# Patient Record
Sex: FEMALE | Race: BLACK OR AFRICAN AMERICAN | NOT HISPANIC OR LATINO | Employment: STUDENT | URBAN - METROPOLITAN AREA
[De-identification: names, ages, dates, MRNs, and addresses within clinical notes are randomized per-mention and may not be internally consistent; named-entity substitution may affect disease eponyms.]

---

## 2018-05-14 ENCOUNTER — INITIAL CONSULT (OUTPATIENT)
Dept: OPHTHALMOLOGY | Facility: CLINIC | Age: 18
End: 2018-05-14
Payer: COMMERCIAL

## 2018-05-14 DIAGNOSIS — H52.213 IRREGULAR ASTIGMATISM OF BOTH EYES: ICD-10-CM

## 2018-05-14 DIAGNOSIS — H18.603 KERATOCONUS OF BOTH EYES: Primary | ICD-10-CM

## 2018-05-14 PROCEDURE — 92025 CPTRIZED CORNEAL TOPOGRAPHY: CPT | Mod: S$GLB,,, | Performed by: OPHTHALMOLOGY

## 2018-05-14 PROCEDURE — 92004 COMPRE OPH EXAM NEW PT 1/>: CPT | Mod: S$GLB,,, | Performed by: OPHTHALMOLOGY

## 2018-05-14 PROCEDURE — 99999 PR PBB SHADOW E&M-NEW PATIENT-LVL II: CPT | Mod: PBBFAC,,, | Performed by: OPHTHALMOLOGY

## 2018-05-14 NOTE — Clinical Note
pls add to CXL list.  And lmk when old records from [facility] arrive - so I can submit for insurance. Thanks.

## 2018-05-14 NOTE — LETTER
May 15, 2018      Gilbert Barrett MD  [address] [facility]  [address]  [place] [address]           [facility] - Ophthalmology  [address]  [place] [address]  Phone: [phone]  Fax: [phone]          Patient: Mesha Parada   MR Number: [id number]   YOB: 2000   Date of Visit: 5/14/2018       Dear Dr. Gilbert Barrett:    Thank you for referring Mesha Parada to me for evaluation. Attached you will find relevant portions of my assessment and plan of care.    If you have questions, please do not hesitate to call me. I look forward to following Mesha Parada along with you.    Sincerely,    Millie Phelan MD    Enclosure  CC:  No Recipients    If you would like to receive this communication electronically, please contact [email or web address] or [phone] to request more information on [employer] Link access.    For providers and/or their staff who would like to refer a patient to [facility], please contact us through our one-stop-shop provider referral line, [facility], at [phone].    If you feel you have received this communication in error or would no longer like to receive these types of communications, please e-mail [email or web address]

## 2018-05-15 NOTE — PROGRESS NOTES
HPI     Dr Gilbert Barrett referral    KCN OU - recently dx'ed    No eyedrops  No eye surgery     Pt referred by Dr Barrett for eval keratoconus OU.  Pt without complaints   today OU.  Pt denies eye pain OU today.     Last edited by Millie Phelan MD on 5/15/2018  9:51 AM. (History)            Assessment /Plan     For exam results, see Encounter Report.    Keratoconus of both eyes  -     Computerized corneal topography    Irregular astigmatism of both eyes  -     Computerized corneal topography      Will request old records from Dr. Marmolejo to determine old Mrx and compare to today's Arx/Mrx = and submit request for insurance approval.    Pt also has old pair of glasses that she can bring if necessary    KERATOCONUS OU     The diagnosis of corneal ectasia and its etiology and clinical course were discussed. Treatment with the use of specialty contact lenses, collagen cross linking, and corneal transplantation was explained in detail.     This patient would be an excellent candidate for the FDA approved epi-off collagen cross linking.  R/B/A discussed. Goal is to prevent progression of ectasia.  Pt will be fit with specialty CL after cornea has healed.    I personally reviewed old records.  There has been a significant increase in cyl on Mrx and increase in myopia as well.  This patient exhibits signs of progression of keratoconus and failure of conservative treatments with spectacles and/or contact lenses.     Disease progression is indicated by:  - An increase of >1D in the Kmax  - An increase of >1D of astigmatism in the MRx  - An increase in myopia of >0.50D on MRx     I recommend treatment with Collagen Crosslinking using the Avedro FDA approved epi-off protocol with Photrexa and the KXL System, in order to stabilize this condition.     Plan:   KXL treatment OS 1st  advanced KCN CCT --> will need pentacam pre-op     I have informed the patient that we will seek insurance pre-approval, but in case of failure of the  insurance company to cover the cost of this medically necessary procedure, there may be a cost of $2,000 for the patient.

## 2018-07-18 ENCOUNTER — TELEPHONE (OUTPATIENT)
Dept: OPHTHALMOLOGY | Facility: CLINIC | Age: 18
End: 2018-07-18

## 2018-07-18 NOTE — TELEPHONE ENCOUNTER
Spoke to pt mother and advised that we are working on a date for CXL and will call her back once I have a date for her.  Advised pt mother, per Dr Phelan, to hold off on getting glasses for pt .  Pt mother understood.

## 2018-07-23 ENCOUNTER — TELEPHONE (OUTPATIENT)
Dept: OPHTHALMOLOGY | Facility: CLINIC | Age: 18
End: 2018-07-23

## 2018-07-23 NOTE — TELEPHONE ENCOUNTER
Spoke to pt mother and scheduled pt CXL procedure.  Went over pre-op instructions and post op instructions with pt mother.  Pt mother will call back to reschedule if the date does not work for the pt.

## 2018-07-23 NOTE — TELEPHONE ENCOUNTER
----- Message from Lorri Estrada MA sent at 7/18/2018 11:14 AM CDT -----  Adelaida IRIZARRY Staff  Caller: Mother (Today,  9:33 AM)         Pt mother calling to find out what her daughter should do about glasses.  Should she continue to wear what she has until we determine if she is approved for cross-linking or should she see an optometrist for a new prescription.  She can be reached at [phone] (Mesha) or her mother [phone].

## 2018-08-15 ENCOUNTER — TELEPHONE (OUTPATIENT)
Dept: OPHTHALMOLOGY | Facility: CLINIC | Age: 18
End: 2018-08-15

## 2018-08-15 NOTE — TELEPHONE ENCOUNTER
Spoke to pt and advised of the $2000.00 fee that is to be paid prior to the CXL procedure.  Advised pt that Dr Phelan will confirm it is pre-authorized before doing the procedure and if so they will get reimbursed by the insurance company.  Pt understood but states she wanted to see Dr Phelan with her mother to discuss things further before the procedure.  Offered pt several dates and times, but pt states she will have to call back when she figures out her school schedule.

## 2018-08-31 ENCOUNTER — TELEPHONE (OUTPATIENT)
Dept: OPHTHALMOLOGY | Facility: CLINIC | Age: 18
End: 2018-08-31

## 2018-08-31 NOTE — TELEPHONE ENCOUNTER
----- Message from Lorri Estrada MA sent at 8/31/2018 12:06 PM CDT -----  Adelaida IRIZARRY Staff  Caller: Mesha (Today, 11:54 AM)         Pt calling to speak with Lorri.  She gave no reason for the call but would like her to call them back at [phone]

## 2018-09-05 ENCOUNTER — TELEPHONE (OUTPATIENT)
Dept: OPHTHALMOLOGY | Facility: CLINIC | Age: 18
End: 2018-09-05

## 2018-09-05 NOTE — TELEPHONE ENCOUNTER
Spoke to pt and she confirmed procedure with paying $2000.  Answered pt questions regarding procedure and eyedrops to be used 2 days before the procedure.  Pt understood and was advised to call with any questions or concerns.

## 2018-09-05 NOTE — TELEPHONE ENCOUNTER
----- Message from Lorri Estrada MA sent at 8/31/2018 12:06 PM CDT -----  Adelaida IRIZARRY Staff  Caller: Mesha (Today, 11:54 AM)         Pt calling to speak with Lorri.  She gave no reason for the call but would like her to call them back at [phone]

## 2018-09-12 ENCOUNTER — TELEPHONE (OUTPATIENT)
Dept: OPHTHALMOLOGY | Facility: CLINIC | Age: 18
End: 2018-09-12

## 2018-09-12 NOTE — TELEPHONE ENCOUNTER
Tried to call pt regarding prescriptions for CXL with Dr Phelan on 9/17/18.  No answer, LM for pt to return my call.

## 2018-09-13 ENCOUNTER — TELEPHONE (OUTPATIENT)
Dept: OPHTHALMOLOGY | Facility: CLINIC | Age: 18
End: 2018-09-13

## 2018-09-13 NOTE — TELEPHONE ENCOUNTER
----- Message from Lorri Estrada MA sent at 9/13/2018  8:59 AM CDT -----  FELECIA Sam MA  Caller: Mesha Martin Messages      ----- Message -----   From: Rosa James   Sent: 9/12/2018  10:55 AM   To: Tapan IRIZARRY Staff     Patient Returning Call from [facility]     Who Left Message for Patient:Lorri   Communication Preference:[phone]   Additional Information:

## 2018-09-13 NOTE — TELEPHONE ENCOUNTER
Spoke to pt and she did not get an rx for eyedrops or Valium.  Advised pt that I would have Dr Phelan send in the prescriptions.  Pt understood.

## 2018-09-13 NOTE — TELEPHONE ENCOUNTER
----- Message from Lorri Estrada MA sent at 9/13/2018  8:59 AM CDT -----  FELECIA Sam MA  Caller: Mesha Martin Messages      ----- Message -----   From: Rosa James   Sent: 9/12/2018  10:55 AM   To: Tapan IRIZARRY Staff     Patient Returning Call from [facility]     Who Left Message for Patient:Lorri   Communication Preference:[phone]   Additional Information:

## 2018-09-14 ENCOUNTER — TELEPHONE (OUTPATIENT)
Dept: OPHTHALMOLOGY | Facility: CLINIC | Age: 18
End: 2018-09-14

## 2018-09-14 RX ORDER — PREDNISOLONE ACETATE 10 MG/ML
1 SUSPENSION/ DROPS OPHTHALMIC 3 TIMES DAILY
Qty: 5 ML | Refills: 1 | Status: SHIPPED | OUTPATIENT
Start: 2018-09-14 | End: 2018-09-24

## 2018-09-14 RX ORDER — DIAZEPAM 2 MG/1
2 TABLET ORAL ONCE AS NEEDED
Qty: 3 TABLET | Refills: 0 | Status: SHIPPED | OUTPATIENT
Start: 2018-09-14 | End: 2018-09-14 | Stop reason: SDUPTHER

## 2018-09-14 RX ORDER — OFLOXACIN 3 MG/ML
1 SOLUTION/ DROPS OPHTHALMIC 3 TIMES DAILY
Qty: 5 ML | Refills: 0 | Status: SHIPPED | OUTPATIENT
Start: 2018-09-14 | End: 2018-10-14

## 2018-09-14 RX ORDER — DIAZEPAM 2 MG/1
2 TABLET ORAL ONCE AS NEEDED
Qty: 3 TABLET | Refills: 0 | Status: SHIPPED | OUTPATIENT
Start: 2018-09-14 | End: 2019-01-18

## 2018-09-17 ENCOUNTER — CLINICAL SUPPORT (OUTPATIENT)
Dept: OPHTHALMOLOGY | Facility: CLINIC | Age: 18
End: 2018-09-17
Attending: OPHTHALMOLOGY
Payer: COMMERCIAL

## 2018-09-17 DIAGNOSIS — H52.213 IRREGULAR ASTIGMATISM OF BOTH EYES: ICD-10-CM

## 2018-09-17 DIAGNOSIS — H18.603 KERATOCONUS OF BOTH EYES: ICD-10-CM

## 2018-09-17 PROCEDURE — 66999 UNLISTED PX ANT SEGMENT EYE: CPT | Mod: S$GLB,,, | Performed by: OPHTHALMOLOGY

## 2018-09-17 PROCEDURE — 99499 UNLISTED E&M SERVICE: CPT | Mod: S$GLB,,, | Performed by: OPHTHALMOLOGY

## 2018-09-17 PROCEDURE — 0402T COLGN CRS-LINK CRN&PACHYMTRY: CPT | Mod: S$GLB,,, | Performed by: OPHTHALMOLOGY

## 2018-09-17 RX ORDER — OXYCODONE AND ACETAMINOPHEN 7.5; 325 MG/1; MG/1
1 TABLET ORAL EVERY 4 HOURS PRN
Qty: 10 TABLET | Refills: 0 | Status: SHIPPED | OUTPATIENT
Start: 2018-09-17 | End: 2018-09-21

## 2018-09-17 NOTE — PROGRESS NOTES
HPI     Dr Gilbert Barrett referral     KCN OU - recently dx'ed     Here for CXL    Last edited by Millie Phelan MD on 9/17/2018 10:06 AM. (History)            Assessment /Plan     For exam results, see Encounter Report.    Keratoconus of both eyes  -     Collagen Cross-Linking    Irregular astigmatism of both eyes  -     Collagen Cross-Linking    Other orders  -     oxyCODONE-acetaminophen (PERCOCET) 7.5-325 mg per tablet; Take 1 tablet by mouth every 4 (four) hours as needed for Pain.  Dispense: 10 tablet; Refill: 0      SURGEON: Millie Phelan MD     PREOPERATIVE DIAGNOSIS: Keratoconus     POSTOPERATIVE DIAGNOSIS: keratoconus     PROCEDURES: Collagen Crosslinking LEFT EYE    ANESTHESIA: Topical Tetracaine 0.5%     COMPLICATIONS: None     ESTIMATED BLOOD LOSS: None     SPECIMENS: None     INDICATIONS:  The patient has a history a progressive corneal ectasia. During the initial consultation, a thorough discussion regarding of the risks, benefits, and alternatives to this procedure was undertaken. Risks were listed on the consent form and were reviewed with emphasis on the remote possibility of infection, inflammation, scarring, and progression of ectasia - all of which can potentially lead to loss of vision. Common side effects from the procedure, including pain, dry eye, glare, and haloes, were also explained, as well as defining realistic expectations of stabilizing the disease but not decreasing the need for glasses or contact lenses. The patient voices understanding of these risks and side effects and communicates realistic expectations from the procedure.      DESCRIPTION OF PROCEDURE:  The patient was admitted to the [facility] at [facility] where the operative eye and procedure were verified, vital signs were taken, and pre-operatively 5-10mg of oral diazepam and drops of Zymar and Pred Forte were administered. The patient was brought into the LASER suite and positioned on the bed. A central  9mm epithelial debridement was achieved with the \[facility]\"" epithelial scrubber, and the initial riboflavin drops administered. A 30 minute induction with drops every 2 minutes was followed by pachymetry. When corneal pachy is less than 400um, hypotonic riboflavin drops are used to thicken the cornea to a minimum of 400um. Next, a 30 min UV light treatment, centered on the cornea was delivered. Antibiotics and a bandage contact lens were placed.  The patient was discharged with care instructions and a follow up appointment in the eye clinic.       F/up Friday - VA OS only, GK to remove BCL

## 2018-09-21 ENCOUNTER — OFFICE VISIT (OUTPATIENT)
Dept: OPHTHALMOLOGY | Facility: CLINIC | Age: 18
End: 2018-09-21
Payer: COMMERCIAL

## 2018-09-21 DIAGNOSIS — H18.603 KERATOCONUS OF BOTH EYES: Primary | ICD-10-CM

## 2018-09-21 DIAGNOSIS — H52.213 IRREGULAR ASTIGMATISM OF BOTH EYES: ICD-10-CM

## 2018-09-21 PROCEDURE — 99999 PR PBB SHADOW E&M-EST. PATIENT-LVL II: CPT | Mod: PBBFAC,,, | Performed by: OPHTHALMOLOGY

## 2018-09-21 PROCEDURE — 99024 POSTOP FOLLOW-UP VISIT: CPT | Mod: S$GLB,,, | Performed by: OPHTHALMOLOGY

## 2018-09-21 RX ORDER — BETAMETHASONE DIPROPIONATE 0.5 MG/ML
LOTION, AUGMENTED TOPICAL
Refills: 3 | COMMUNITY
Start: 2018-08-14 | End: 2019-03-08

## 2018-09-21 RX ORDER — PANTOPRAZOLE SODIUM 40 MG/1
40 TABLET, DELAYED RELEASE ORAL DAILY
Refills: 6 | COMMUNITY
Start: 2018-06-15 | End: 2019-01-18

## 2018-09-21 NOTE — LETTER
September 21, 2018      [facility] - Ophthalmology  [address]  [facility] [address]  Phone: [phone]  Fax: [phone]       Patient: Mesha Parada   YOB: 2000  Date of Visit: 09/21/2018    To Whom It May Concern:    Melissa Parada  was at [facility] on 09/21/2018. Please excuse her from school on 09/17/2018 for surgery. She may return to school on 09/26/2018 with no restrictions. If you have any questions or concerns, or if I can be of further assistance, please do not hesitate to contact me.    Sincerely,  ADELINA Clemente MA

## 2018-09-21 NOTE — PROGRESS NOTES
HPI     Dr Gilbert Barrett referral     S/p CXL OS 9/17/18  KCN OU - recently dx'ed     Gtts: PF 4 times a day OS           Ocuflox 4 times a day OS    Patient states she is doing well. Denies any pain. Slight photophobia OS     Last edited by Millie Phelan MD on 9/21/2018  2:19 PM. (History)            Assessment /Plan     For exam results, see Encounter Report.    Keratoconus of both eyes    Irregular astigmatism of both eyes      S/p CXL OS 9/17/18  - doing well, bcl removed  - okay to stop abx  - PF TID     F/up 4 wks - va/IOP OS    KCN OU - recently dx'ed     Can discuss CXL OD next.

## 2018-09-21 NOTE — LETTER
September 21, 2018      [facility] - Ophthalmology  [address]  [facility] [address]  Phone: [phone]  Fax: [phone]       Patient: Mesha Parada   YOB: 2000  Date of Visit: 09/21/2018    To Whom It May Concern:    Melissa Parada  was at [facility] on 09/21/2018. She may return to school on 09/24/2018 with no restrictions. If you have any questions or concerns, or if I can be of further assistance, please do not hesitate to contact me.    Sincerely,  Dr.Ginny Tapan M.D.    Eric Johnston MA

## 2018-10-19 ENCOUNTER — OFFICE VISIT (OUTPATIENT)
Dept: OPHTHALMOLOGY | Facility: CLINIC | Age: 18
End: 2018-10-19
Payer: COMMERCIAL

## 2018-10-19 DIAGNOSIS — H52.213 IRREGULAR ASTIGMATISM OF BOTH EYES: ICD-10-CM

## 2018-10-19 DIAGNOSIS — H18.603 KERATOCONUS OF BOTH EYES: Primary | ICD-10-CM

## 2018-10-19 PROCEDURE — 99999 PR PBB SHADOW E&M-EST. PATIENT-LVL II: CPT | Mod: PBBFAC,,, | Performed by: OPHTHALMOLOGY

## 2018-10-19 PROCEDURE — 99024 POSTOP FOLLOW-UP VISIT: CPT | Mod: S$GLB,,, | Performed by: OPHTHALMOLOGY

## 2018-10-19 NOTE — PROGRESS NOTES
HPI     Dr Gilbert Barrett referral     S/p CXL OS 9/17/18  KCN OU -  Recently dx'ed    Gtts: PF 4 times a day OS           Ocuflox 4 times a day OS     pt is here for 4 week CXL f/u.no complaints.    Last edited by Millie Phelan MD on 10/19/2018  2:54 PM. (History)            Assessment /Plan     For exam results, see Encounter Report.    Keratoconus of both eyes    Irregular astigmatism of both eyes          S/p CXL OS 9/17/18  - doing well, PF QD x 2 wks then d/c    KCN OU - recently dx'ed     LOY OU, ARx/ Mrx OU    Can discuss CXL OD next. And possible need for CL fit.

## 2019-01-18 ENCOUNTER — OFFICE VISIT (OUTPATIENT)
Dept: OPHTHALMOLOGY | Facility: CLINIC | Age: 19
End: 2019-01-18
Payer: COMMERCIAL

## 2019-01-18 DIAGNOSIS — H18.603 KERATOCONUS OF BOTH EYES: Primary | ICD-10-CM

## 2019-01-18 DIAGNOSIS — H52.213 IRREGULAR ASTIGMATISM OF BOTH EYES: ICD-10-CM

## 2019-01-18 PROCEDURE — 92025 CPTRIZED CORNEAL TOPOGRAPHY: CPT | Mod: S$GLB,,, | Performed by: OPHTHALMOLOGY

## 2019-01-18 PROCEDURE — 92025 COMPUTERIZED CORNEAL TOPOGRAPHY: ICD-10-PCS | Mod: S$GLB,,, | Performed by: OPHTHALMOLOGY

## 2019-01-18 PROCEDURE — 99999 PR PBB SHADOW E&M-EST. PATIENT-LVL II: CPT | Mod: PBBFAC,,, | Performed by: OPHTHALMOLOGY

## 2019-01-18 PROCEDURE — 99024 PR POST-OP FOLLOW-UP VISIT: ICD-10-PCS | Mod: S$GLB,,, | Performed by: OPHTHALMOLOGY

## 2019-01-18 PROCEDURE — 99999 PR PBB SHADOW E&M-EST. PATIENT-LVL II: ICD-10-PCS | Mod: PBBFAC,,, | Performed by: OPHTHALMOLOGY

## 2019-01-18 PROCEDURE — 99024 POSTOP FOLLOW-UP VISIT: CPT | Mod: S$GLB,,, | Performed by: OPHTHALMOLOGY

## 2019-01-18 NOTE — Clinical Note
pls add to cxl list - OD, she's had OS done already.  She is insured - 2K.  (is that what she paid for OS?)She wants it done over the summer - June or July. I told her we would call May'venkat with date.

## 2019-01-18 NOTE — PROGRESS NOTES
HPI     Concerns About Ocular Health      Additional comments: angela              Comments     Dr Gilbert Barrett referral     S/p CXL OS 9/17/18  ANGELA OU -  Dx'ed 2018    Gtts: AT's      pt is here for 3 mo CXL OD  Pt doing well stopped gtts no discomfort or   problems             Last edited by Millie Phelan MD on 1/18/2019 12:54 PM. (History)            Assessment /Plan     For exam results, see Encounter Report.    Keratoconus of both eyes  -     Computerized corneal topography    Irregular astigmatism of both eyes  -     Computerized corneal topography      S/p CXL OS 9/17/18  - doing well off all gtts  - okay for CL fit with dr. Joy MILLER OU   - dx'ed 2018  - would benefit from CXL OD --> pt wants to schedule during the summer

## 2019-01-21 ENCOUNTER — TELEPHONE (OUTPATIENT)
Dept: OPTOMETRY | Facility: CLINIC | Age: 19
End: 2019-01-21

## 2019-01-23 ENCOUNTER — TELEPHONE (OUTPATIENT)
Dept: OPTOMETRY | Facility: CLINIC | Age: 19
End: 2019-01-23

## 2019-03-07 ENCOUNTER — TELEPHONE (OUTPATIENT)
Dept: OPTOMETRY | Facility: CLINIC | Age: 19
End: 2019-03-07

## 2019-03-08 ENCOUNTER — INITIAL CONSULT (OUTPATIENT)
Dept: OPTOMETRY | Facility: CLINIC | Age: 19
End: 2019-03-08
Payer: COMMERCIAL

## 2019-03-08 DIAGNOSIS — H18.603 KERATOCONUS OF BOTH EYES: Primary | ICD-10-CM

## 2019-03-08 DIAGNOSIS — H52.213 IRREGULAR ASTIGMATISM, BILATERAL: ICD-10-CM

## 2019-03-08 PROCEDURE — 92012 INTRM OPH EXAM EST PATIENT: CPT | Mod: S$GLB,,, | Performed by: OPTOMETRIST

## 2019-03-08 PROCEDURE — 99999 PR PBB SHADOW E&M-EST. PATIENT-LVL I: ICD-10-PCS | Mod: PBBFAC,,, | Performed by: OPTOMETRIST

## 2019-03-08 PROCEDURE — 92012 PR EYE EXAM, EST PATIENT,INTERMED: ICD-10-PCS | Mod: S$GLB,,, | Performed by: OPTOMETRIST

## 2019-03-08 PROCEDURE — 99999 PR PBB SHADOW E&M-EST. PATIENT-LVL I: CPT | Mod: PBBFAC,,, | Performed by: OPTOMETRIST

## 2019-03-08 RX ORDER — PANTOPRAZOLE SODIUM 40 MG/1
40 TABLET, DELAYED RELEASE ORAL DAILY
COMMUNITY

## 2019-03-08 NOTE — PROGRESS NOTES
Reviewed and agreed with Resident assessment  Reviewed lens correction options and patient prefers staying with sRx  Ok to Order SCls or Scleral lens based on patient preference in the future

## 2019-03-08 NOTE — LETTER
March 8, 2019      Millie Phelan MD  [address]  [facility] [address]           [facility]  [address], [address]  [facility] [address]  Phone: [phone]  Fax: [phone]          Patient: Mesha Parada   MR Number: [id number]   YOB: 2000   Date of Visit: 3/8/2019       Dear Dr. Millie Phelan:    Thank you for referring Mesha Parada to me for evaluation. Attached you will find relevant portions of my assessment and plan of care.    If you have questions, please do not hesitate to call me. I look forward to following Mesha Parada along with you.    Sincerely,    Sergio Hamilton, OD    Enclosure  CC:  No Recipients    If you would like to receive this communication electronically, please contact [email or web address] or [phone] to request more information on [employer] Link access.    For providers and/or their staff who would like to refer a patient to [facility], please contact us through our one-stop-shop provider referral line, [facility] [place], at [phone].    If you feel you have received this communication in error or would no longer like to receive these types of communications, please e-mail [email or web address]

## 2019-03-08 NOTE — PROGRESS NOTES
HPI     Pt is in for contact fitting. Has never worn contacts before. Pt hasnt   been in glasses for about a year. Pt not sure if she is interested in CLs.   Planning crosslinking OD in June. Does not have glasses with her.     Last edited by Ketty Park, OD on 3/8/2019  2:11 PM. (History)            Assessment /Plan     For exam results, see Encounter Report.    Keratoconus of both eyes    Irregular astigmatism, bilateral      Educated mother and pt on findings.   Updated SRx. BCVA 20/30 OD, OS with glasses. Pt not interested in CLs at this time. BCVA 20/30 OS in scleral lens. Crosslinking scheduled DS in the summer.   Also given option of soft CL. Pt to think about.     RTC prn.

## 2019-03-25 ENCOUNTER — TELEPHONE (OUTPATIENT)
Dept: OPTOMETRY | Facility: CLINIC | Age: 19
End: 2019-03-25

## 2019-08-05 ENCOUNTER — TELEPHONE (OUTPATIENT)
Dept: OPHTHALMOLOGY | Facility: CLINIC | Age: 19
End: 2019-08-05

## 2019-08-05 NOTE — TELEPHONE ENCOUNTER
Called pt to discuss CXL OD. Patient states she hasn't decided yet if she wants to move forward. Will call when ready

## 2023-08-17 ENCOUNTER — OFFICE VISIT (OUTPATIENT)
Dept: CARDIOLOGY | Facility: CLINIC | Age: 23
End: 2023-08-17
Payer: COMMERCIAL

## 2023-08-17 ENCOUNTER — HOSPITAL ENCOUNTER (OUTPATIENT)
Dept: CARDIOLOGY | Facility: HOSPITAL | Age: 23
Discharge: HOME | End: 2023-08-17
Attending: INTERNAL MEDICINE
Payer: COMMERCIAL

## 2023-08-17 VITALS
HEART RATE: 90 BPM | DIASTOLIC BLOOD PRESSURE: 82 MMHG | BODY MASS INDEX: 32.94 KG/M2 | HEIGHT: 62 IN | SYSTOLIC BLOOD PRESSURE: 135 MMHG | WEIGHT: 179 LBS | OXYGEN SATURATION: 99 %

## 2023-08-17 DIAGNOSIS — R42 DIZZINESS AND GIDDINESS: Primary | ICD-10-CM

## 2023-08-17 DIAGNOSIS — Z86.16 HISTORY OF COVID-19: ICD-10-CM

## 2023-08-17 DIAGNOSIS — F41.9 ANXIETY: ICD-10-CM

## 2023-08-17 DIAGNOSIS — R07.9 CHEST PAIN, UNSPECIFIED TYPE: Primary | ICD-10-CM

## 2023-08-17 DIAGNOSIS — R42 DIZZINESS AND GIDDINESS: ICD-10-CM

## 2023-08-17 DIAGNOSIS — R03.0 ELEVATED BLOOD PRESSURE READING: ICD-10-CM

## 2023-08-17 DIAGNOSIS — K21.9 GASTROESOPHAGEAL REFLUX DISEASE, UNSPECIFIED WHETHER ESOPHAGITIS PRESENT: ICD-10-CM

## 2023-08-17 PROCEDURE — 99204 PR OFFICE/OUTPT VISIT, NEW, LEVL IV, 45-59 MIN: ICD-10-PCS | Mod: S$GLB,,, | Performed by: INTERNAL MEDICINE

## 2023-08-17 PROCEDURE — 93005 ELECTROCARDIOGRAM TRACING: CPT

## 2023-08-17 PROCEDURE — 99999 PR PBB SHADOW E&M-NEW PATIENT-LVL III: CPT | Mod: PBBFAC,,, | Performed by: INTERNAL MEDICINE

## 2023-08-17 PROCEDURE — 93010 ELECTROCARDIOGRAM REPORT: CPT | Mod: ,,, | Performed by: INTERNAL MEDICINE

## 2023-08-17 PROCEDURE — 93010 EKG 12-LEAD: ICD-10-PCS | Mod: ,,, | Performed by: INTERNAL MEDICINE

## 2023-08-17 PROCEDURE — 99999 PR PBB SHADOW E&M-NEW PATIENT-LVL III: ICD-10-PCS | Mod: PBBFAC,,, | Performed by: INTERNAL MEDICINE

## 2023-08-17 PROCEDURE — 99204 OFFICE O/P NEW MOD 45 MIN: CPT | Mod: S$GLB,,, | Performed by: INTERNAL MEDICINE

## 2023-08-17 RX ORDER — CITALOPRAM 10 MG/1
10 TABLET ORAL DAILY
COMMUNITY

## 2023-08-17 NOTE — PROGRESS NOTES
Subjective:   Patient ID:  Mesha Parada is a 22 y.o. female who presents for cardiac consult of No chief complaint on file.      Referral by: Self, Aaareferral  No address on file     Reason for consult:       HPI  The patient came in today for cardiac consult of No chief complaint on file.    8/17/23  Mesha Parada is a 22 y.o. female pt with h/o COVID 19, GERD, anxiety presents for CV eval.     She had chest pressure Mon/Tues this week. She had GERD starting at age 6, has been taking PPI.     Patient feels no leg swelling, no PND, no palpitation, no dizziness, no syncope, no CNS symptoms.    Patient is compliant with medications. Works at [email or web address].     No results found for this or any previous visit.      No results found for this or any previous visit.      No results found for this or any previous visit.      No cardiac monitor results found for the past 12 months         History reviewed. No pertinent past medical history.    History reviewed. No pertinent surgical history.    Social History     Tobacco Use    Smoking status: Never   Substance Use Topics    Alcohol use: No       Family History   Problem Relation Age of Onset    No Known Problems Mother     No Known Problems Father     No Known Problems Sister     No Known Problems Brother     No Known Problems Maternal Aunt     No Known Problems Maternal Uncle     No Known Problems Paternal Aunt     No Known Problems Paternal Uncle     No Known Problems Maternal Grandmother     No Known Problems Maternal Grandfather     No Known Problems Paternal Grandmother     No Known Problems Paternal Grandfather     Blindness Neg Hx     Glaucoma Neg Hx     Macular degeneration Neg Hx     Retinal detachment Neg Hx     Amblyopia Neg Hx     Cancer Neg Hx     Cataracts Neg Hx     Diabetes Neg Hx     Hypertension Neg Hx     Strabismus Neg Hx     Stroke Neg Hx     Thyroid disease Neg Hx        Patient's Medications   New Prescriptions    No medications on file  "  Previous Medications    CITALOPRAM (CELEXA) 10 MG TABLET    Take 10 mg by mouth once daily.    PANTOPRAZOLE (PROTONIX) 40 MG TABLET    Take 40 mg by mouth once daily.   Modified Medications    No medications on file   Discontinued Medications    No medications on file       Review of Systems   Constitutional: Negative.    HENT: Negative.     Eyes: Negative.    Respiratory: Negative.     Cardiovascular:  Positive for chest pain.   Gastrointestinal: Negative.    Genitourinary: Negative.    Musculoskeletal: Negative.    Skin: Negative.    Neurological: Negative.    Endo/Heme/Allergies: Negative.    Psychiatric/Behavioral: Negative.     All 12 systems otherwise negative.      Wt Readings from Last 3 Encounters:   08/17/23 81.2 kg (179 lb 0.2 oz)     Temp Readings from Last 3 Encounters:   No data found for Temp     BP Readings from Last 3 Encounters:   08/17/23 135/82     Pulse Readings from Last 3 Encounters:   08/17/23 90       /82 (BP Location: Left arm, Patient Position: Sitting, BP Method: Medium (Manual))   Pulse 90   Ht 5' 2" (1.575 m)   Wt 81.2 kg (179 lb 0.2 oz)   SpO2 99%   BMI 32.74 kg/m²     Objective:   Physical Exam  Vitals and nursing note reviewed.   Constitutional:       General: She is not in acute distress.     Appearance: She is well-developed. She is obese. She is not diaphoretic.   HENT:      Head: Normocephalic and atraumatic.      Nose: Nose normal.   Eyes:      General: No scleral icterus.     Conjunctiva/sclera: Conjunctivae normal.   Neck:      Thyroid: No thyromegaly.      Vascular: No JVD.   Cardiovascular:      Rate and Rhythm: Normal rate and regular rhythm.      Heart sounds: S1 normal and S2 normal. No murmur heard.     No friction rub. No gallop. No S3 or S4 sounds.   Pulmonary:      Effort: Pulmonary effort is normal. No respiratory distress.      Breath sounds: Normal breath sounds. No stridor. No wheezing or rales.   Chest:      Chest wall: No tenderness.   Abdominal:    " "  General: Bowel sounds are normal. There is no distension.      Palpations: Abdomen is soft. There is no mass.      Tenderness: There is no abdominal tenderness. There is no rebound.   Genitourinary:     Comments: Deferred  Musculoskeletal:         General: No tenderness or deformity. Normal range of motion.      Cervical back: Normal range of motion and neck supple.   Lymphadenopathy:      Cervical: No cervical adenopathy.   Skin:     General: Skin is warm and dry.      Coloration: Skin is not pale.      Findings: No erythema or rash.   Neurological:      Mental Status: She is alert and oriented to person, place, and time.      Motor: No abnormal muscle tone.      Coordination: Coordination normal.   Psychiatric:         Behavior: Behavior normal.         Thought Content: Thought content normal.         Judgment: Judgment normal.         No results found for: "NA", "K", "CL", "CO2", "BUN", "CREATININE", "GLU", "HGBA1C", "MG", "AST", "ALT", "ALBUMIN", "PROT", "BILITOT", "WBC", "HGB", "HCT", "MCV", "PLT", "INR", "TSH", "CHOL", "HDL", "LDLCALC", "TRIG", "BNP"      No results found for: "BNP", "INR"       Assessment:      1. Chest pain, unspecified type    2. Gastroesophageal reflux disease, unspecified whether esophagitis present    3. Anxiety    4. History of COVID-19    5. BMI 32.0-32.9,adult    6. Elevated blood pressure reading        Plan:       GERD  - cont PPI    2. Anxiety  - cont tx    3. Chest pain, occ mild elevated BP; h/o COVID 19 - March 2022  - atypical  - order ECG stress test and ECHO    4. Obesity, BMI 32   - cont weight loss    Thank you for allowing me to participate in this patient's care. Please do not hesitate to contact me with any questions or concerns. Consult note has been forwarded to the referral physician.     "

## 2023-08-21 ENCOUNTER — PATIENT MESSAGE (OUTPATIENT)
Dept: RESEARCH | Facility: HOSPITAL | Age: 23
End: 2023-08-21
Payer: COMMERCIAL

## 2023-08-25 ENCOUNTER — HOSPITAL ENCOUNTER (OUTPATIENT)
Dept: CARDIOLOGY | Facility: HOSPITAL | Age: 23
Discharge: HOME | End: 2023-08-25
Attending: INTERNAL MEDICINE
Payer: COMMERCIAL

## 2023-08-25 VITALS
DIASTOLIC BLOOD PRESSURE: 82 MMHG | BODY MASS INDEX: 32.94 KG/M2 | WEIGHT: 179 LBS | SYSTOLIC BLOOD PRESSURE: 135 MMHG | HEIGHT: 62 IN | WEIGHT: 179 LBS | HEIGHT: 62 IN | BODY MASS INDEX: 32.94 KG/M2

## 2023-08-25 DIAGNOSIS — K21.9 GASTROESOPHAGEAL REFLUX DISEASE, UNSPECIFIED WHETHER ESOPHAGITIS PRESENT: ICD-10-CM

## 2023-08-25 DIAGNOSIS — R07.9 CHEST PAIN, UNSPECIFIED TYPE: ICD-10-CM

## 2023-08-25 DIAGNOSIS — Z86.16 HISTORY OF COVID-19: ICD-10-CM

## 2023-08-25 DIAGNOSIS — F41.9 ANXIETY: ICD-10-CM

## 2023-08-25 LAB
AORTIC ROOT ANNULUS: 2.18 CM
ASCENDING AORTA: 2.79 CM
AV INDEX (PROSTH): 0.67
AV MEAN GRADIENT: 4 MMHG
AV PEAK GRADIENT: 9 MMHG
AV VALVE AREA BY VELOCITY RATIO: 1.62 CM²
AV VALVE AREA: 1.83 CM²
AV VELOCITY RATIO: 0.6
BSA FOR ECHO PROCEDURE: 1.88 M2
CV ECHO LV RWT: 0.5 CM
CV STRESS BASE HR: 89 BPM
DIASTOLIC BLOOD PRESSURE: 77 MMHG
DOP CALC AO PEAK VEL: 1.49 M/S
DOP CALC AO VTI: 28.1 CM
DOP CALC LVOT AREA: 2.7 CM2
DOP CALC LVOT DIAMETER: 1.86 CM
DOP CALC LVOT PEAK VEL: 0.89 M/S
DOP CALC LVOT STROKE VOLUME: 51.33 CM3
DOP CALC RVOT PEAK VEL: 1.1 M/S
DOP CALC RVOT VTI: 18.9 CM
DOP CALCLVOT PEAK VEL VTI: 18.9 CM
E WAVE DECELERATION TIME: 208.65 MSEC
E/A RATIO: 1.14
E/E' RATIO: 6.48 M/S
ECHO LV POSTERIOR WALL: 1.03 CM (ref 0.6–1.1)
EJECTION FRACTION: 60 %
FRACTIONAL SHORTENING: 36 % (ref 28–44)
INTERVENTRICULAR SEPTUM: 0.96 CM (ref 0.6–1.1)
IVC DIAMETER: 1.15 CM
IVRT: 74.22 MSEC
LA MAJOR: 5.3 CM
LA MINOR: 5.15 CM
LA WIDTH: 3.2 CM
LEFT ATRIUM SIZE: 3.1 CM
LEFT ATRIUM VOLUME INDEX: 24.2 ML/M2
LEFT ATRIUM VOLUME: 44.05 CM3
LEFT INTERNAL DIMENSION IN SYSTOLE: 2.63 CM (ref 2.1–4)
LEFT VENTRICLE DIASTOLIC VOLUME INDEX: 41.37 ML/M2
LEFT VENTRICLE DIASTOLIC VOLUME: 75.29 ML
LEFT VENTRICLE MASS INDEX: 73 G/M2
LEFT VENTRICLE SYSTOLIC VOLUME INDEX: 13.9 ML/M2
LEFT VENTRICLE SYSTOLIC VOLUME: 25.26 ML
LEFT VENTRICULAR INTERNAL DIMENSION IN DIASTOLE: 4.12 CM (ref 3.5–6)
LEFT VENTRICULAR MASS: 132.19 G
LV LATERAL E/E' RATIO: 5.4 M/S
LV SEPTAL E/E' RATIO: 8.1 M/S
LVOT MG: 1.82 MMHG
LVOT MV: 0.64 CM/S
MV PEAK A VEL: 0.71 M/S
MV PEAK E VEL: 0.81 M/S
MV STENOSIS PRESSURE HALF TIME: 60.51 MS
MV VALVE AREA P 1/2 METHOD: 3.64 CM2
OHS CV CPX 1 MINUTE RECOVERY HEART RATE: 155 BPM
OHS CV CPX 85 PERCENT MAX PREDICTED HEART RATE MALE: 157
OHS CV CPX ESTIMATED METS: 10
OHS CV CPX MAX PREDICTED HEART RATE: 185
OHS CV CPX PATIENT IS FEMALE: 1
OHS CV CPX PATIENT IS MALE: 0
OHS CV CPX PEAK DIASTOLIC BLOOD PRESSURE: 71 MMHG
OHS CV CPX PEAK HEAR RATE: 187 BPM
OHS CV CPX PEAK RATE PRESSURE PRODUCT: NORMAL
OHS CV CPX PEAK SYSTOLIC BLOOD PRESSURE: 158 MMHG
OHS CV CPX PERCENT MAX PREDICTED HEART RATE ACHIEVED: 101
OHS CV CPX RATE PRESSURE PRODUCT PRESENTING: NORMAL
PULM VEIN S/D RATIO: 1.22
PV MEAN GRADIENT: 2 MMHG
PV MV: 0.83 M/S
PV PEAK D VEL: 0.5 M/S
PV PEAK GRADIENT: 6 MMHG
PV PEAK S VEL: 0.61 M/S
PV PEAK VELOCITY: 1.23 M/S
RA MAJOR: 4.2 CM
RA PRESSURE ESTIMATED: 3 MMHG
RA WIDTH: 3.56 CM
RIGHT VENTRICULAR END-DIASTOLIC DIMENSION: 3.21 CM
SINUS: 2.26 CM
STJ: 2.27 CM
STRESS ECHO POST EXERCISE DUR MIN: 9 MINUTES
STRESS ECHO POST EXERCISE DUR SEC: 0 SECONDS
SYSTOLIC BLOOD PRESSURE: 115 MMHG
TDI LATERAL: 0.15 M/S
TDI SEPTAL: 0.1 M/S
TDI: 0.13 M/S
TRICUSPID ANNULAR PLANE SYSTOLIC EXCURSION: 1.82 CM
Z-SCORE OF LEFT VENTRICULAR DIMENSION IN END DIASTOLE: -2.01
Z-SCORE OF LEFT VENTRICULAR DIMENSION IN END SYSTOLE: -1.33

## 2023-08-25 PROCEDURE — 93016 EXERCISE STRESS - EKG (CUPID ONLY): ICD-10-PCS | Mod: ,,, | Performed by: INTERNAL MEDICINE

## 2023-08-25 PROCEDURE — 93306 TTE W/DOPPLER COMPLETE: CPT | Mod: 26,,, | Performed by: INTERNAL MEDICINE

## 2023-08-25 PROCEDURE — 93016 CV STRESS TEST SUPVJ ONLY: CPT | Mod: ,,, | Performed by: INTERNAL MEDICINE

## 2023-08-25 PROCEDURE — 93306 TTE W/DOPPLER COMPLETE: CPT

## 2023-08-25 PROCEDURE — 93017 CV STRESS TEST TRACING ONLY: CPT

## 2023-08-25 PROCEDURE — 93306 ECHO (CUPID ONLY): ICD-10-PCS | Mod: 26,,, | Performed by: INTERNAL MEDICINE

## 2023-08-25 PROCEDURE — 93018 EXERCISE STRESS - EKG (CUPID ONLY): ICD-10-PCS | Mod: ,,, | Performed by: INTERNAL MEDICINE

## 2023-08-25 PROCEDURE — 93018 CV STRESS TEST I&R ONLY: CPT | Mod: ,,, | Performed by: INTERNAL MEDICINE

## 2023-09-08 ENCOUNTER — OFFICE VISIT (OUTPATIENT)
Dept: OPHTHALMOLOGY | Facility: CLINIC | Age: 23
End: 2023-09-08
Payer: COMMERCIAL

## 2023-09-08 ENCOUNTER — PATIENT MESSAGE (OUTPATIENT)
Dept: OPHTHALMOLOGY | Facility: CLINIC | Age: 23
End: 2023-09-08

## 2023-09-08 DIAGNOSIS — H52.7 REFRACTIVE ERRORS: ICD-10-CM

## 2023-09-08 DIAGNOSIS — H18.603 KERATOCONUS OF BOTH EYES: Primary | ICD-10-CM

## 2023-09-08 PROCEDURE — 92014 COMPRE OPH EXAM EST PT 1/>: CPT | Mod: S$GLB,,, | Performed by: OPTOMETRIST

## 2023-09-08 PROCEDURE — 92015 DETERMINE REFRACTIVE STATE: CPT | Mod: S$GLB,,, | Performed by: OPTOMETRIST

## 2023-09-08 PROCEDURE — 99999 PR PBB SHADOW E&M-EST. PATIENT-LVL II: ICD-10-PCS | Mod: PBBFAC,,, | Performed by: OPTOMETRIST

## 2023-09-08 PROCEDURE — 92014 PR EYE EXAM, EST PATIENT,COMPREHESV: ICD-10-PCS | Mod: S$GLB,,, | Performed by: OPTOMETRIST

## 2023-09-08 PROCEDURE — 99999 PR PBB SHADOW E&M-EST. PATIENT-LVL II: CPT | Mod: PBBFAC,,, | Performed by: OPTOMETRIST

## 2023-09-08 PROCEDURE — 92015 PR REFRACTION: ICD-10-PCS | Mod: S$GLB,,, | Performed by: OPTOMETRIST

## 2023-09-08 NOTE — PROGRESS NOTES
HPI    Decrease distance visual acuity  New patient last eye exam 01/19/2019 Millie Phelan  Update glasses RX.  Hx of Keratoconus OU  S/p CXL OS 9/17/18  KCN OU -  Dx'ed 2018  Last edited by Lanre Brooke, OD on 9/8/2023  9:04 AM.            Assessment /Plan     For exam results, see Encounter Report.    Keratoconus of both eyes    Refractive errors      History of Cornea Cross Link OS, doing well    Dispense Final Rx for glasses. Hyperopic astig, specs more for computer use, eye strain  RTC 1 year  Discussed above and answered questions.

## 2024-09-04 ENCOUNTER — OFFICE VISIT (OUTPATIENT)
Dept: OPHTHALMOLOGY | Facility: CLINIC | Age: 24
End: 2024-09-04
Payer: COMMERCIAL

## 2024-09-04 ENCOUNTER — PATIENT MESSAGE (OUTPATIENT)
Dept: OPHTHALMOLOGY | Facility: CLINIC | Age: 24
End: 2024-09-04

## 2024-09-04 DIAGNOSIS — H52.7 REFRACTIVE ERRORS: ICD-10-CM

## 2024-09-04 DIAGNOSIS — H18.603 KERATOCONUS OF BOTH EYES: Primary | ICD-10-CM

## 2024-09-04 PROCEDURE — 92015 DETERMINE REFRACTIVE STATE: CPT | Mod: S$GLB,,, | Performed by: OPTOMETRIST

## 2024-09-04 PROCEDURE — 92014 COMPRE OPH EXAM EST PT 1/>: CPT | Mod: S$GLB,,, | Performed by: OPTOMETRIST

## 2024-09-04 PROCEDURE — 99999 PR PBB SHADOW E&M-EST. PATIENT-LVL II: CPT | Mod: PBBFAC,,, | Performed by: OPTOMETRIST

## 2024-09-04 NOTE — PROGRESS NOTES
SUBJECTIVE  Mesha Parada is 23 y.o. female  Uncorrected distance visual acuity was 20/25 in the right eye and 20/25 in the left eye. Uncorrected near visual acuity was J1 in the right eye and J1 in the left eye.   Chief Complaint   Patient presents with    Annual Exam          HPI    Patient states no visual complaints.  No ocular pain/discomfort and not using any otc drops.  Wear glasses but mostly at night and did not bring them today.     S/p CXL OS 9/17/18  KCN OU -  Dx'ed 2018   Last edited by Michelle Khan on 9/4/2024  1:39 PM.         Assessment /Plan :  1. Keratoconus of both eyes    History of Cornea Cross Link     2. Refractive errors    Dispense Final Rx for glasses. Hyperopic astig, specs more for computer use, eye strain  RTC 1 year  Discussed above and answered questions.

## 2025-09-03 ENCOUNTER — OFFICE VISIT (OUTPATIENT)
Dept: OPHTHALMOLOGY | Facility: CLINIC | Age: 25
End: 2025-09-03
Payer: COMMERCIAL

## 2025-09-03 DIAGNOSIS — H52.7 REFRACTIVE ERRORS: ICD-10-CM

## 2025-09-03 DIAGNOSIS — H18.603 KERATOCONUS OF BOTH EYES: Primary | ICD-10-CM

## 2025-09-03 PROCEDURE — 99999 PR PBB SHADOW E&M-EST. PATIENT-LVL II: CPT | Mod: PBBFAC,,, | Performed by: OPTOMETRIST
